# Patient Record
Sex: FEMALE | Race: WHITE | NOT HISPANIC OR LATINO | Employment: FULL TIME | URBAN - METROPOLITAN AREA
[De-identification: names, ages, dates, MRNs, and addresses within clinical notes are randomized per-mention and may not be internally consistent; named-entity substitution may affect disease eponyms.]

---

## 2023-04-10 ENCOUNTER — OFFICE VISIT (OUTPATIENT)
Dept: SLEEP MEDICINE | Facility: MEDICAL CENTER | Age: 38
End: 2023-04-10
Attending: STUDENT IN AN ORGANIZED HEALTH CARE EDUCATION/TRAINING PROGRAM
Payer: COMMERCIAL

## 2023-04-10 VITALS
SYSTOLIC BLOOD PRESSURE: 118 MMHG | DIASTOLIC BLOOD PRESSURE: 72 MMHG | RESPIRATION RATE: 16 BRPM | OXYGEN SATURATION: 98 % | BODY MASS INDEX: 44.9 KG/M2 | HEART RATE: 66 BPM | HEIGHT: 64 IN | WEIGHT: 263 LBS

## 2023-04-10 DIAGNOSIS — G47.33 OSA (OBSTRUCTIVE SLEEP APNEA): Primary | ICD-10-CM

## 2023-04-10 PROCEDURE — 99202 OFFICE O/P NEW SF 15 MIN: CPT | Performed by: STUDENT IN AN ORGANIZED HEALTH CARE EDUCATION/TRAINING PROGRAM

## 2023-04-10 PROCEDURE — 99203 OFFICE O/P NEW LOW 30 MIN: CPT | Performed by: STUDENT IN AN ORGANIZED HEALTH CARE EDUCATION/TRAINING PROGRAM

## 2023-04-10 NOTE — PROGRESS NOTES
[facility] Consult Note     Date: 4/10/2023 / Time: 8:04 AM      Thank you for requesting a sleep medicine consultation on Jackie Aguilar at the sleep center. Presents today with the chief complaints of needing new CPAP supplies. She is referred by Karla Park M.D.  [address] [place] [id number] for evaluation and treatment of obstructive sleep apnea on CPAP  HISTORY OF PRESENT ILLNESS:     Jackie Aguilar is a 38 y.o. female with obesity and obstructive sleep apnea on CPAP.  Presents to Sleep Clinic to establish care for management of obstructive sleep apnea.     States she was diagnosed approximately 5 years ago with obstructive sleep apnea via in lab sleep study.  She was told she had mild obstructive sleep apnea and she could consider starting CPAP therapy.  Then in 2020 she underwent a home sleep study which showed continued sleep apnea.  She has been told that she did snore in her sleep and did have choking and gasping episodes.  There is concern regarding this affecting her health and she elected to pursue treatment.  She has been on CPAP since 2020.  She finds that with using the CPAP machine she gets a better quality of sleep.  She also does not snore or have gasping episodes in her sleep.    She moved to [place] and with the move she changed insurances.  She was previously with [employer] who does not take her current insurance.  She is hopeful that setting up with a new DME company will allow her to get supplies and continue to use her machine.    DME provider: Roger arce   Device: Airsense 10  Mask: N30i   Aerophagia: No  Snoring: No   Dry mouth: No   Leak: No   Skin irritation: No   Chin strap: No       As per supplemental questionnaire to be scanned or imported into chart:    Barrington Sleepiness Score: 1    Sleep Schedule  Bedtime: Weekday 10 pm  Weekend 11pm  Wake time: Weekday 555am Weekend 8am  Sleep-onset latency: 30-45 min   Awakenings from sleep: 0  Difficulty falling back  "asleep: No  Bedroom partner: yes  Naps: No     DAYTIME SYMPTOMS:   Excessive daytime sleepiness: No   Daytime fatigue: No   Difficulty concentrating: No   Memory problems: No   Irritability:No   Work/school performance issues: No   Sleepiness with driving: No   Caffeine/stimulant use: Yes  Alcohol use:Yes, How Many? Ocassionally      SLEEP RELATED SYMPTOMS  Snoring: Yes, without cpap  Witnessed apnea or gasping/choking: Yes, without cpap  Dry mouth or mouth breathing: No   Sweating: No   Teeth grinding/biting: Yes  Morning headaches: No   Refreshed Upon Awakening: Yes     SLEEP RELATED BEHAVIORS:  Parasomnias (walking, talking, eating, violence): No   Leg kicking: No   Restless legs - \"urge to move\": No   Nightmares: No  Recurrent: No   Dream enactment: No      NARCOLEPSY:  Cataplexy: No   Sleep paralysis: No   Sleep attacks: No   Hypnagogic/hypnopompic hallucinations: No     MEDICAL HISTORY  Past Medical History:   Diagnosis Date    Chickenpox     Influenza     Obesity     Sleep apnea     Wears glasses         SURGICAL HISTORY  Past Surgical History:   Procedure Laterality Date    ARTHROSCOPY, KNEE          FAMILY HISTORY  Family History   Problem Relation Age of Onset    Breast Cancer Mother         currently Breast Cancer free    Sleep Apnea Mother     Alzheimer's Disease Father         FTD and Alz. diagnosis in 2017, passed away from it in 2020.    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      VITAMIN D PO Take  by mouth.      Probiotic Product (PROBIOTIC DAILY PO) Take  by mouth.       No current facility-administered medications for this visit.       REVIEW OF SYSTEMS  Constitutional: Denies fevers, Denies weight changes  Ears/Nose/Throat/Mouth: Denies nasal congestion or sore throat   Cardiovascular: Denies chest pain  Respiratory: Denies shortness of breath, Denies cough  Gastrointestinal/Hepatic: Denies nausea, vomiting  Sleep: see HPI    Physical Examination:  Vitals/ General Appearance:   Weight/BMI: Body mass index is 45.14 kg/m².  /72 (BP Location: Left arm, Patient Position: Sitting, BP Cuff Size: Large adult)   Pulse 66   Resp 16   Ht 1.626 m (5' 4\")   Wt 119 kg (263 lb)   SpO2 98%   Vitals:    04/10/23 0801   BP: 118/72   BP Location: Left arm   Patient Position: Sitting   BP Cuff Size: Large adult   Pulse: 66   Resp: 16   SpO2: 98%   Weight: 119 kg (263 lb)   Height: 1.626 m (5' 4\")       Pt. is alert and oriented to time, place and person. Cooperative and in no apparent distress.     Constitutional: Alert, no distress, well-groomed.  Skin: No rashes in visible areas.  Eye: Round. Conjunctiva clear, lids normal. No icterus.   ENT EXAM  Nasal alae/valves collapsible: No   Nasal septum deviation: No   Nasal turbinate hypertrophy: Left: Grade 1   Right: Grade 1  Hard palate narrow: No   Hard palate high: No   Soft palate/uvula (Mallampati score): 3  Tongue Scalloping: Yes  Retrognathia: No   Micrognathia: No   Cardiovascular:no murmus/gallops/rubs, normal S1 and S2 heart sounds, regular rate and rhythm  Pulmonary:Clear to auscultation, No wheezes, No crackles.  Neurologic:Awake, alert and oriented x 3, Normal age appropriate gait, No involuntary motions.  Extremities: No clubbing, cyanosis, or edema       ASSESSMENT AND PLAN   Jackie Aguilar is a 38 y.o. female with obesity and obstructive sleep apnea on CPAP.  Presents to Sleep Clinic to " establish care for management of obstructive sleep apnea.     1.  Obstructive sleep apnea  The medical record was reviewed.    Compliance data reviewed showing 100% usage > 4hours in last 30  days. Average AHI 0.5 events/hour. 90-95% pressure 10.2 CWP. Pt continues to use and benefit from machine.        PLAN:   -Order placed for mask and supplies   -Advised to reach out via MyChart with questions     Has been advised to continue the current CPAP, clean equipment frequently, and get new mask and supplies as allowed by insurance and DME. Recommend an earlier appointment, if significant treatment barriers develop.    Patients with NATE are at increased risk of cardiovascular disease including coronary artery disease, systemic arterial hypertension, pulmonary arterial hypertension, cardiac arrythmias, and stroke. The patient was advised to avoid driving a motor vehicle when drowsy.    Positive airway pressure will favorably impact many of the adverse conditions and effects provoked by NATE.    Have advised the patient to follow up with the appropriate healthcare practitioners for all other medical problems and issues.    Return in about 1 year (around 4/10/2024).       2.  Regarding treatment of other past medical problems and general health maintenance,  Pt is urged to follow up with PCP.      Please note portions of this record was created using voice recognition software. I have made every reasonable attempt to correct obvious errors, but I expect that there are errors of grammar and possibly content I did not discover before finalizing the note.

## 2023-05-04 ENCOUNTER — HOSPITAL ENCOUNTER (OUTPATIENT)
Dept: LAB | Facility: MEDICAL CENTER | Age: 38
End: 2023-05-04
Attending: FAMILY MEDICINE
Payer: COMMERCIAL

## 2023-05-04 LAB
BASOPHILS # BLD AUTO: 0.9 % (ref 0–1.8)
BASOPHILS # BLD: 0.08 K/UL (ref 0–0.12)
EOSINOPHIL # BLD AUTO: 0.27 K/UL (ref 0–0.51)
EOSINOPHIL NFR BLD: 2.9 % (ref 0–6.9)
ERYTHROCYTE [DISTWIDTH] IN BLOOD BY AUTOMATED COUNT: 50.4 FL (ref 35.9–50)
HCT VFR BLD AUTO: 42.8 % (ref 37–47)
HGB BLD-MCNC: 13.1 G/DL (ref 12–16)
IMM GRANULOCYTES # BLD AUTO: 0.04 K/UL (ref 0–0.11)
IMM GRANULOCYTES NFR BLD AUTO: 0.4 % (ref 0–0.9)
LYMPHOCYTES # BLD AUTO: 2.76 K/UL (ref 1–4.8)
LYMPHOCYTES NFR BLD: 30.1 % (ref 22–41)
MCH RBC QN AUTO: 27.3 PG (ref 27–33)
MCHC RBC AUTO-ENTMCNC: 30.6 G/DL (ref 33.6–35)
MCV RBC AUTO: 89.4 FL (ref 81.4–97.8)
MONOCYTES # BLD AUTO: 0.39 K/UL (ref 0–0.85)
MONOCYTES NFR BLD AUTO: 4.3 % (ref 0–13.4)
NEUTROPHILS # BLD AUTO: 5.63 K/UL (ref 2–7.15)
NEUTROPHILS NFR BLD: 61.4 % (ref 44–72)
NRBC # BLD AUTO: 0 K/UL
NRBC BLD-RTO: 0 /100 WBC
PLATELET # BLD AUTO: 301 K/UL (ref 164–446)
PMV BLD AUTO: 13 FL (ref 9–12.9)
RBC # BLD AUTO: 4.79 M/UL (ref 4.2–5.4)
WBC # BLD AUTO: 9.2 K/UL (ref 4.8–10.8)

## 2023-05-04 PROCEDURE — 84480 ASSAY TRIIODOTHYRONINE (T3): CPT

## 2023-05-04 PROCEDURE — 36415 COLL VENOUS BLD VENIPUNCTURE: CPT

## 2023-05-04 PROCEDURE — 80061 LIPID PANEL: CPT

## 2023-05-04 PROCEDURE — 84443 ASSAY THYROID STIM HORMONE: CPT

## 2023-05-04 PROCEDURE — 82533 TOTAL CORTISOL: CPT

## 2023-05-04 PROCEDURE — 83001 ASSAY OF GONADOTROPIN (FSH): CPT

## 2023-05-04 PROCEDURE — 83002 ASSAY OF GONADOTROPIN (LH): CPT

## 2023-05-04 PROCEDURE — 85025 COMPLETE CBC W/AUTO DIFF WBC: CPT

## 2023-05-04 PROCEDURE — 80053 COMPREHEN METABOLIC PANEL: CPT

## 2023-05-04 PROCEDURE — 82670 ASSAY OF TOTAL ESTRADIOL: CPT

## 2023-05-04 PROCEDURE — 83525 ASSAY OF INSULIN: CPT

## 2023-05-04 PROCEDURE — 84439 ASSAY OF FREE THYROXINE: CPT

## 2023-05-05 LAB
ALBUMIN SERPL BCP-MCNC: 4.3 G/DL (ref 3.2–4.9)
ALBUMIN/GLOB SERPL: 1.4 G/DL
ALP SERPL-CCNC: 72 U/L (ref 30–99)
ALT SERPL-CCNC: 31 U/L (ref 2–50)
ANION GAP SERPL CALC-SCNC: 13 MMOL/L (ref 7–16)
AST SERPL-CCNC: 30 U/L (ref 12–45)
BILIRUB SERPL-MCNC: 0.3 MG/DL (ref 0.1–1.5)
BUN SERPL-MCNC: 13 MG/DL (ref 8–22)
CALCIUM ALBUM COR SERPL-MCNC: 8.9 MG/DL (ref 8.5–10.5)
CALCIUM SERPL-MCNC: 9.1 MG/DL (ref 8.5–10.5)
CHLORIDE SERPL-SCNC: 104 MMOL/L (ref 96–112)
CHOLEST SERPL-MCNC: 200 MG/DL (ref 100–199)
CO2 SERPL-SCNC: 23 MMOL/L (ref 20–33)
CORTIS SERPL-MCNC: 7 UG/DL (ref 0–23)
CREAT SERPL-MCNC: 0.47 MG/DL (ref 0.5–1.4)
ESTRADIOL SERPL-MCNC: 80 PG/ML
FASTING STATUS PATIENT QL REPORTED: NORMAL
FSH SERPL-ACNC: 8.7 MIU/ML
GFR SERPLBLD CREATININE-BSD FMLA CKD-EPI: 126 ML/MIN/1.73 M 2
GLOBULIN SER CALC-MCNC: 3.1 G/DL (ref 1.9–3.5)
GLUCOSE SERPL-MCNC: 92 MG/DL (ref 65–99)
HDLC SERPL-MCNC: 47 MG/DL
LDLC SERPL CALC-MCNC: 130 MG/DL
LH SERPL-ACNC: 36.9 IU/L
POTASSIUM SERPL-SCNC: 4.4 MMOL/L (ref 3.6–5.5)
PROT SERPL-MCNC: 7.4 G/DL (ref 6–8.2)
SODIUM SERPL-SCNC: 140 MMOL/L (ref 135–145)
T3 SERPL-MCNC: 142 NG/DL (ref 60–181)
T4 FREE SERPL-MCNC: 1.07 NG/DL (ref 0.93–1.7)
TRIGL SERPL-MCNC: 115 MG/DL (ref 0–149)
TSH SERPL DL<=0.005 MIU/L-ACNC: 1.18 UIU/ML (ref 0.38–5.33)

## 2023-05-06 LAB — INSULIN P FAST SERPL-ACNC: 19 UIU/ML (ref 3–25)

## 2023-10-09 ENCOUNTER — TELEPHONE (OUTPATIENT)
Dept: SLEEP MEDICINE | Facility: MEDICAL CENTER | Age: 38
End: 2023-10-09
Payer: COMMERCIAL

## 2023-10-12 ENCOUNTER — TELEMEDICINE (OUTPATIENT)
Dept: SLEEP MEDICINE | Facility: MEDICAL CENTER | Age: 38
End: 2023-10-12
Attending: NURSE PRACTITIONER
Payer: COMMERCIAL

## 2023-10-12 VITALS — HEIGHT: 64 IN | BODY MASS INDEX: 39.78 KG/M2 | WEIGHT: 233 LBS

## 2023-10-12 DIAGNOSIS — G47.33 OSA (OBSTRUCTIVE SLEEP APNEA): ICD-10-CM

## 2023-10-12 PROCEDURE — 99213 OFFICE O/P EST LOW 20 MIN: CPT | Mod: 95 | Performed by: NURSE PRACTITIONER

## 2023-10-12 RX ORDER — SEMAGLUTIDE 0.5 MG/.5ML
INJECTION, SOLUTION SUBCUTANEOUS
COMMUNITY
Start: 2023-09-29

## 2023-10-12 ASSESSMENT — FIBROSIS 4 INDEX: FIB4 SCORE: 0.64

## 2023-10-12 ASSESSMENT — PATIENT HEALTH QUESTIONNAIRE - PHQ9: CLINICAL INTERPRETATION OF PHQ2 SCORE: 0

## 2023-10-12 NOTE — PATIENT INSTRUCTIONS
Order for mask and supplies will be sent to the following DME company:  [employer]  Located in: [facility]  Address: [address] [place] [id number], [place] [address]  Phone: [phone]    Contact in 1 week for order status.  Also mask sample left at  for Tonya nasal mask fit pack.

## 2023-10-12 NOTE — PROGRESS NOTES
Virtual Visit: Established Patient   This visit was conducted via Zoom using secure and encrypted videoconferencing technology.   The patient was in their home in the state of [place].    The patient's identity was confirmed and verbal consent was obtained for this virtual visit.     Subjective:   CC:   Chief Complaint   Patient presents with    Follow-Up     NATE  LAST SEEN ON 4/10/2023 - DR. UMBERTO MEJIADANIELA ANDREWS is a 38 y.o. female presenting for evaluation and management of:    NATE.  Last seen 4/10/2023 by Dr. Camargo.  Since last visit patient switched insurance companies and needs new office visit for mask and supplies. States she was diagnosed approximately 5 years ago with obstructive sleep apnea via in lab sleep study.  She was told she had mild obstructive sleep apnea and she could consider starting CPAP therapy.  Then in 2020 she underwent a home sleep study which showed continued sleep apnea.  She has been told that she did snore in her sleep and did have choking and gasping episodes.  There is concern regarding this affecting her health and she elected to pursue treatment.  She has been on CPAP since 2020.  She finds that with using the CPAP machine she gets a better quality of sleep.  She also does not snore or have gasping episodes in her sleep.     She moved to [place] and with the move she changed insurances.  She was previously with [employer] who does not take her current insurance.  She is hopeful that setting up with a new [employer] company will allow her to get supplies and continue to use her machine.    30-day compliance reviewed with patient shows 100% use with an average time of 6 hours and 45 minutes and a resultant AHI of 0.5 with no evidence of persistent mask leak.  She is currently using a [employer]Med AirFit N30 I does not like the mask due to the noisy foam piece inserted into the middle of the nose piece.  She averages 7 hours of sleep and denies any difficulty falling or staying asleep.  She notes  "overall improvement in sleep quality and denies any excessive daytime sleepiness, morning headaches, palpitations, concentration or memory problems.    ROS   Per HPI    Current medicines (including changes today)  Current Outpatient Medications   Medication Sig Dispense Refill    WEGOVY 0.5 MG/0.5ML Solution Auto-injector Pen-injector inject 0.5 mL subcutaneous once a week      VITAMIN D PO Take  by mouth.      Probiotic Product (PROBIOTIC DAILY PO) Take  by mouth.      Cetirizine HCl (ZYRTEC PO) Take  by mouth.       No current facility-administered medications for this visit.       There are no problems to display for this patient.       Objective:   Ht 1.626 m (5' 4\")   Wt 106 kg (233 lb)   BMI 39.99 kg/m²     Physical Exam:  Constitutional: Alert, no distress, well-groomed.  Skin: No rashes in visible areas.  Eye: Round. Conjunctiva clear, lids normal. No icterus.   ENMT: Lips pink without lesions, good dentition, moist mucous membranes. Phonation normal.  Neck: No masses, no thyromegaly. Moves freely without pain.  Respiratory: Unlabored respiratory effort, no cough or audible wheeze  Psych: Alert and oriented x3, normal affect and mood.     Assessment and Plan:   The following treatment plan was discussed:     1. NATE (obstructive sleep apnea)  - DME Mask and Supplies    Other orders  - WEGOVY 0.5 MG/0.5ML Solution Auto-injector Pen-injector; inject 0.5 mL subcutaneous once a week    Patient is using and benefiting from CPAP therapy.  Compliance reviewed with patient shows adequate use and control of NATE.  Order placed for mask and supplies which will be good for 1 year through [employer].  Please follow-up annually, but can be seen sooner if needed.    Follow-up: Return in about 1 year (around 10/12/2024).           "

## 2024-01-08 ENCOUNTER — HOSPITAL ENCOUNTER (OUTPATIENT)
Dept: CARDIOLOGY | Facility: MEDICAL CENTER | Age: 39
End: 2024-01-08
Attending: FAMILY MEDICINE
Payer: COMMERCIAL

## 2024-01-08 DIAGNOSIS — R06.02 SHORTNESS OF BREATH: ICD-10-CM

## 2024-01-08 DIAGNOSIS — R07.89 OTHER CHEST PAIN: ICD-10-CM

## 2024-01-08 LAB — LV EJECT FRACT  99904: 65

## 2024-01-08 PROCEDURE — 93306 TTE W/DOPPLER COMPLETE: CPT | Mod: 26 | Performed by: INTERNAL MEDICINE

## 2024-01-08 PROCEDURE — 93306 TTE W/DOPPLER COMPLETE: CPT

## 2024-02-09 ENCOUNTER — OFFICE VISIT (OUTPATIENT)
Dept: URGENT CARE | Facility: PHYSICIAN GROUP | Age: 39
End: 2024-02-09
Payer: COMMERCIAL

## 2024-02-09 ENCOUNTER — HOSPITAL ENCOUNTER (OUTPATIENT)
Facility: MEDICAL CENTER | Age: 39
End: 2024-02-09
Attending: PHYSICIAN ASSISTANT
Payer: COMMERCIAL

## 2024-02-09 VITALS
TEMPERATURE: 98.4 F | DIASTOLIC BLOOD PRESSURE: 80 MMHG | RESPIRATION RATE: 16 BRPM | HEART RATE: 111 BPM | HEIGHT: 64 IN | WEIGHT: 213.6 LBS | SYSTOLIC BLOOD PRESSURE: 134 MMHG | OXYGEN SATURATION: 98 % | BODY MASS INDEX: 36.47 KG/M2

## 2024-02-09 DIAGNOSIS — R30.0 DYSURIA: ICD-10-CM

## 2024-02-09 DIAGNOSIS — N30.01 ACUTE CYSTITIS WITH HEMATURIA: ICD-10-CM

## 2024-02-09 LAB
APPEARANCE UR: NORMAL
BILIRUB UR STRIP-MCNC: NEGATIVE MG/DL
COLOR UR AUTO: NORMAL
GLUCOSE UR STRIP.AUTO-MCNC: NEGATIVE MG/DL
KETONES UR STRIP.AUTO-MCNC: NEGATIVE MG/DL
LEUKOCYTE ESTERASE UR QL STRIP.AUTO: NORMAL
NITRITE UR QL STRIP.AUTO: NEGATIVE
PH UR STRIP.AUTO: 6 [PH] (ref 5–8)
POCT INT CON NEG: NEGATIVE
POCT INT CON POS: POSITIVE
POCT URINE PREGNANCY TEST: NEGATIVE
PROT UR QL STRIP: NORMAL MG/DL
RBC UR QL AUTO: NORMAL
SP GR UR STRIP.AUTO: 1.01
UROBILINOGEN UR STRIP-MCNC: NORMAL MG/DL

## 2024-02-09 PROCEDURE — 87186 SC STD MICRODIL/AGAR DIL: CPT

## 2024-02-09 PROCEDURE — 99204 OFFICE O/P NEW MOD 45 MIN: CPT | Performed by: PHYSICIAN ASSISTANT

## 2024-02-09 PROCEDURE — 87510 GARDNER VAG DNA DIR PROBE: CPT

## 2024-02-09 PROCEDURE — 3075F SYST BP GE 130 - 139MM HG: CPT | Performed by: PHYSICIAN ASSISTANT

## 2024-02-09 PROCEDURE — 81002 URINALYSIS NONAUTO W/O SCOPE: CPT | Performed by: PHYSICIAN ASSISTANT

## 2024-02-09 PROCEDURE — 3079F DIAST BP 80-89 MM HG: CPT | Performed by: PHYSICIAN ASSISTANT

## 2024-02-09 PROCEDURE — 87086 URINE CULTURE/COLONY COUNT: CPT

## 2024-02-09 PROCEDURE — 81025 URINE PREGNANCY TEST: CPT | Performed by: PHYSICIAN ASSISTANT

## 2024-02-09 PROCEDURE — 87480 CANDIDA DNA DIR PROBE: CPT

## 2024-02-09 PROCEDURE — 87077 CULTURE AEROBIC IDENTIFY: CPT

## 2024-02-09 PROCEDURE — 87660 TRICHOMONAS VAGIN DIR PROBE: CPT

## 2024-02-09 RX ORDER — NITROFURANTOIN 25; 75 MG/1; MG/1
100 CAPSULE ORAL 2 TIMES DAILY
Qty: 10 CAPSULE | Refills: 0 | Status: SHIPPED | OUTPATIENT
Start: 2024-02-09 | End: 2024-02-14

## 2024-02-09 ASSESSMENT — ENCOUNTER SYMPTOMS
CHILLS: 0
FLANK PAIN: 0
FEVER: 0

## 2024-02-09 ASSESSMENT — FIBROSIS 4 INDEX: FIB4 SCORE: 0.66

## 2024-02-10 DIAGNOSIS — N30.01 ACUTE CYSTITIS WITH HEMATURIA: ICD-10-CM

## 2024-02-10 NOTE — PROGRESS NOTES
Subjective:   Jackie Aguilar is a 39 y.o. female who presents today with   Chief Complaint   Patient presents with    Dysuria     Starting this morning, felt at first like a yeast infection but as day progressed it started feeling worse. Saw some blood when urinating. Painful to urinate.      Dysuria   This is a new problem. The current episode started today. The problem has been unchanged. The quality of the pain is described as burning. The pain is mild. She is Sexually active. There is No history of pyelonephritis. Associated symptoms include hematuria. Pertinent negatives include no chills, flank pain, frequency or urgency.     Patient states no concerns for STDs at this time.  No new sexual partners.      PMH:  has a past medical history of Chickenpox, Influenza, Obesity, Sleep apnea, and Wears glasses.  MEDS:   Current Outpatient Medications:     Loratadine (CLARITIN PO), Take 1 Dose by mouth every day., Disp: , Rfl:     Semaglutide, 1 MG/DOSE, 2 MG/1.5ML Solution Pen-injector, Inject 1 mg under the skin every 7 days., Disp: , Rfl:     nitrofurantoin (MACROBID) 100 MG Cap, Take 1 Capsule by mouth 2 times a day for 5 days., Disp: 10 Capsule, Rfl: 0    VITAMIN D PO, Take  by mouth., Disp: , Rfl:     Probiotic Product (PROBIOTIC DAILY PO), Take  by mouth., Disp: , Rfl:     WEGOVY 0.5 MG/0.5ML Solution Auto-injector Pen-injector, inject 0.5 mL subcutaneous once a week, Disp: , Rfl:     Cetirizine HCl (ZYRTEC PO), Take  by mouth., Disp: , Rfl:   ALLERGIES:   Allergies   Allergen Reactions    Anesthetics, Amide [Amides]      SURGHX:   Past Surgical History:   Procedure Laterality Date    ARTHROSCOPY, KNEE       SOCHX:  reports that she has never smoked. She has never used smokeless tobacco. She reports current alcohol use of about 1.8 oz of alcohol per week. She reports that she does not currently use drugs after having used the following drugs: Marijuana.  FH: Reviewed with patient, not pertinent to this  "visit.     Review of Systems   Constitutional:  Negative for chills and fever.   Genitourinary:  Positive for dysuria and hematuria. Negative for flank pain, frequency and urgency.      Objective:   /80   Pulse (!) 111   Temp 36.9 °C (98.4 °F)   Resp 16   Ht 1.626 m (5' 4\")   Wt 96.9 kg (213 lb 9.6 oz)   LMP 01/31/2024 (Exact Date)   SpO2 98%   BMI 36.66 kg/m²   Physical Exam  Vitals and nursing note reviewed.   Constitutional:       General: She is not in acute distress.     Appearance: Normal appearance. She is well-developed. She is not ill-appearing or toxic-appearing.   HENT:      Head: Normocephalic and atraumatic.      Right Ear: Hearing normal.      Left Ear: Hearing normal.   Cardiovascular:      Rate and Rhythm: Normal rate.   Pulmonary:      Effort: Pulmonary effort is normal.   Abdominal:      Tenderness: There is abdominal tenderness in the suprapubic area. There is no right CVA tenderness or left CVA tenderness.   Genitourinary:     Comments: Patient deferred  exam  Musculoskeletal:      Comments: Normal movement in all 4 extremities   Skin:     General: Skin is warm and dry.   Neurological:      Mental Status: She is alert.      Coordination: Coordination normal.   Psychiatric:         Mood and Affect: Mood normal.       UA consistent with infection  HCG negative    Assessment/Plan:   Assessment    1. Acute cystitis with hematuria  - nitrofurantoin (MACROBID) 100 MG Cap; Take 1 Capsule by mouth 2 times a day for 5 days.  Dispense: 10 Capsule; Refill: 0  - URINE CULTURE(NEW); Future  - VAGINAL PATHOGENS DNA PANEL; Future    2. Dysuria  - POCT Urinalysis  - POCT Pregnancy    Other orders  - Loratadine (CLARITIN PO); Take 1 Dose by mouth every day.  - Semaglutide, 1 MG/DOSE, 2 MG/1.5ML Solution Pen-injector; Inject 1 mg under the skin every 7 days.    Symptoms and presentation appear consistent with urinary tract infection and we will treat accordingly with antibiotics.  Recommend ruling " out BV and yeast infection with self swab and she agrees.    Differential diagnosis, natural history, supportive care, and indications for immediate follow-up discussed.   Patient given instructions and understanding of medications and treatment.    If not improving in 3-5 days, F/U with PCP or return to UC if symptoms worsen.    Patient agreeable to plan.    Please note that this dictation was created using voice recognition software. I have made every reasonable attempt to correct obvious errors, but I expect that there are errors of grammar and possibly content that I did not discover before finalizing the note.    Rui Nguyễn PA-C

## 2024-02-11 LAB
CANDIDA DNA VAG QL PROBE+SIG AMP: NEGATIVE
G VAGINALIS DNA VAG QL PROBE+SIG AMP: NEGATIVE
T VAGINALIS DNA VAG QL PROBE+SIG AMP: NEGATIVE

## 2024-02-12 LAB
BACTERIA UR CULT: ABNORMAL
BACTERIA UR CULT: ABNORMAL
SIGNIFICANT IND 70042: ABNORMAL
SITE SITE: ABNORMAL
SOURCE SOURCE: ABNORMAL

## 2024-02-28 ENCOUNTER — OFFICE VISIT (OUTPATIENT)
Dept: URGENT CARE | Facility: CLINIC | Age: 39
End: 2024-02-28
Payer: COMMERCIAL

## 2024-02-28 VITALS
DIASTOLIC BLOOD PRESSURE: 62 MMHG | TEMPERATURE: 97.4 F | BODY MASS INDEX: 35 KG/M2 | SYSTOLIC BLOOD PRESSURE: 108 MMHG | RESPIRATION RATE: 16 BRPM | HEART RATE: 62 BPM | OXYGEN SATURATION: 96 % | HEIGHT: 64 IN | WEIGHT: 205 LBS

## 2024-02-28 DIAGNOSIS — B96.89 ACUTE BACTERIAL SINUSITIS: ICD-10-CM

## 2024-02-28 DIAGNOSIS — J01.90 ACUTE BACTERIAL SINUSITIS: ICD-10-CM

## 2024-02-28 PROCEDURE — 99213 OFFICE O/P EST LOW 20 MIN: CPT | Performed by: PHYSICIAN ASSISTANT

## 2024-02-28 PROCEDURE — 3074F SYST BP LT 130 MM HG: CPT | Performed by: PHYSICIAN ASSISTANT

## 2024-02-28 PROCEDURE — 3078F DIAST BP <80 MM HG: CPT | Performed by: PHYSICIAN ASSISTANT

## 2024-02-28 RX ORDER — AMOXICILLIN AND CLAVULANATE POTASSIUM 875; 125 MG/1; MG/1
1 TABLET, FILM COATED ORAL 2 TIMES DAILY
Qty: 14 TABLET | Refills: 0 | Status: SHIPPED | OUTPATIENT
Start: 2024-02-28 | End: 2024-03-06

## 2024-02-28 RX ORDER — SEMAGLUTIDE 0.25 MG/.5ML
INJECTION, SOLUTION SUBCUTANEOUS
COMMUNITY

## 2024-02-28 RX ORDER — PREDNISONE 10 MG/1
40 TABLET ORAL DAILY
Qty: 20 TABLET | Refills: 0 | Status: SHIPPED | OUTPATIENT
Start: 2024-02-28 | End: 2024-03-04

## 2024-02-28 ASSESSMENT — ENCOUNTER SYMPTOMS
HEADACHES: 1
SORE THROAT: 1
CHILLS: 0
COUGH: 1
CONSTIPATION: 0
SHORTNESS OF BREATH: 0
VOMITING: 0
EYE PAIN: 0
FEVER: 0
NAUSEA: 0
ABDOMINAL PAIN: 0
DIARRHEA: 0
MYALGIAS: 0

## 2024-02-28 ASSESSMENT — FIBROSIS 4 INDEX: FIB4 SCORE: 0.66

## 2024-02-29 NOTE — PROGRESS NOTES
"Subjective:   Jackie Aguilar is a 39 y.o. female who presents for Fever (2/14 started with  Fever, congestion, cough, and sore throat, feeling better cough still persistent, ear pressure, )      39-year-old female began feeling sick on 214 with typical cold-like symptoms, in general she seem like she was improving but her last few days has started to notice more malaise and fatigue, more ear pain and pressure, more productive sputum.  She has not had more sinus pain or pressure.  She is not using an antihistamine, nasal steroid, Lacey pot and Mucinex nightly.  Her cough is been shallow and mild and intermittent.  She also notes some mild sore throat.    Review of Systems   Constitutional:  Positive for malaise/fatigue. Negative for chills and fever.   HENT:  Positive for congestion, ear pain, hearing loss and sore throat.    Eyes:  Negative for pain.   Respiratory:  Positive for cough. Negative for shortness of breath.    Cardiovascular:  Negative for chest pain.   Gastrointestinal:  Negative for abdominal pain, constipation, diarrhea, nausea and vomiting.   Genitourinary:  Negative for dysuria.   Musculoskeletal:  Negative for myalgias.   Skin:  Negative for rash.   Neurological:  Positive for headaches.       Medications, Allergies, and current problem list reviewed today in Epic.     Objective:     /62   Pulse 62   Temp 36.3 °C (97.4 °F) (Temporal)   Resp 16   Ht 1.626 m (5' 4\")   Wt 93 kg (205 lb)   SpO2 96%     Physical Exam  Vitals reviewed.   Constitutional:       Appearance: Normal appearance.   HENT:      Head: Normocephalic and atraumatic.      Right Ear: External ear normal.      Left Ear: External ear normal.      Ears:      Comments: No erythema TMs however they are bulging with suppurative fluid behind     Nose: Congestion and rhinorrhea present.      Mouth/Throat:      Mouth: Mucous membranes are moist.      Pharynx: No oropharyngeal exudate or posterior oropharyngeal erythema.      " Comments: POST NASAL DRIP  Eyes:      Conjunctiva/sclera: Conjunctivae normal.   Cardiovascular:      Rate and Rhythm: Normal rate and regular rhythm.   Pulmonary:      Effort: Pulmonary effort is normal.      Breath sounds: Normal breath sounds.   Musculoskeletal:      Cervical back: Normal range of motion.   Skin:     General: Skin is warm and dry.      Capillary Refill: Capillary refill takes less than 2 seconds.   Neurological:      Mental Status: She is alert and oriented to person, place, and time.         Assessment/Plan:     Diagnosis and associated orders:     1. Acute bacterial sinusitis  predniSONE (DELTASONE) 10 MG Tab    amoxicillin-clavulanate (AUGMENTIN) 875-125 MG Tab         Comments/MDM:     Recommend patient trial a 5-day course of prednisone.  If failing to improve after the prednisone therapy or worsening at any point I would recommend antibiotics.  She does not have the clear hallmarks of a sinus infection currently and on exam does not appear to have otitis media but I feel like both of these could imminently take place in the near future.  Likewise the fact that she is worsening and it has been over 2 weeks since onset is indicative for a secondary bacterial infection         Differential diagnosis, natural history, supportive care, and indications for immediate follow-up discussed.    Advised the patient to follow-up with the primary care physician for recheck, reevaluation, and consideration of further management.    Please note that this dictation was created using voice recognition software. I have made a reasonable attempt to correct obvious errors, but I expect that there are errors of grammar and possibly content that I did not discover before finalizing the note.    This note was electronically signed by Chapo Vigil PA-C

## 2024-09-25 ENCOUNTER — OFFICE VISIT (OUTPATIENT)
Dept: SLEEP MEDICINE | Facility: MEDICAL CENTER | Age: 39
End: 2024-09-25
Attending: NURSE PRACTITIONER
Payer: COMMERCIAL

## 2024-09-25 VITALS
BODY MASS INDEX: 31.76 KG/M2 | WEIGHT: 186 LBS | SYSTOLIC BLOOD PRESSURE: 112 MMHG | DIASTOLIC BLOOD PRESSURE: 64 MMHG | HEART RATE: 86 BPM | RESPIRATION RATE: 16 BRPM | OXYGEN SATURATION: 95 % | HEIGHT: 64 IN

## 2024-09-25 DIAGNOSIS — Z78.9 NONSMOKER: ICD-10-CM

## 2024-09-25 DIAGNOSIS — G47.33 OBSTRUCTIVE SLEEP APNEA: ICD-10-CM

## 2024-09-25 PROCEDURE — 3078F DIAST BP <80 MM HG: CPT | Performed by: NURSE PRACTITIONER

## 2024-09-25 PROCEDURE — 3074F SYST BP LT 130 MM HG: CPT | Performed by: NURSE PRACTITIONER

## 2024-09-25 PROCEDURE — 99213 OFFICE O/P EST LOW 20 MIN: CPT | Performed by: NURSE PRACTITIONER

## 2024-09-25 ASSESSMENT — PATIENT HEALTH QUESTIONNAIRE - PHQ9: CLINICAL INTERPRETATION OF PHQ2 SCORE: 0

## 2024-09-25 ASSESSMENT — FIBROSIS 4 INDEX: FIB4 SCORE: 0.66

## 2024-09-25 NOTE — PROGRESS NOTES
Chief Complaint   Patient presents with    Apnea     Last Office Visit 10/12/2023 with MARIA GUADALUPE Jensen.       HPI:  Jackie Aguilar is a 39 y.o. year old female here today for follow-up on NATE.  Last OV 10/12/23 via telemedicine with Rufus JIMENEZ     Only living in [facility].    Currently using APAP @ 8-12cm H20 nightly; RESMED; device obtained 6/24/21.  Compliance report 8/27/2024 through 9/25/2024 indicates 97% compliance, average nightly is 5 hours 7 minutes, mean pressure 8.7 cm, no significant mask leak with reduced AHI of 0.5/h.  Reviewed with patient.    Interval events  9/25/2024: Patient notes losing 80 pounds in the last year after starting Wegovy through primary care.  She has not started maintenance therapy on this but would like to continue with further weight loss.  She continues to tolerate CPAP but notes in the last few weeks waking up at 2/3 AM in the morning removing the mask and sometimes being unaware of it and continue to sleep at night.  She notes initial start of pressure to be comfortable.  Her mask is comfortable.  She goes to bed at 9 PM usually asleep by 11 PM.  She admits to playing on her phone.  She generally sleeps through the night till the morning.    Sleep hx:  Diagnosed in 2018 with sleep apnea in sleep lab.  2020 underwent home sleep study showing continued sleep apnea.  Started CPAP in 2020.  Moved to [place] in 2023 with change in insurance and needing new supplies.          ROS: As per HPI and otherwise negative if not stated.    Past Medical History:   Diagnosis Date    Chickenpox     Influenza     Obesity     Sleep apnea     Wears glasses        Past Surgical History:   Procedure Laterality Date    ARTHROSCOPY, KNEE         Family History   Problem Relation Age of Onset    Breast Cancer Mother         currently Breast Cancer free    Sleep Apnea Mother     Alzheimer's Disease Father         FTD and Alz. diagnosis in 2017, passed away from it in 2020.     "Prostate cancer Maternal Aunt        Social History     Socioeconomic History    Marital status:      Spouse name: Not on file    Number of children: Not on file    Years of education: Not on file    Highest education level: Not on file   Occupational History    Not on file   Tobacco Use    Smoking status: Never    Smokeless tobacco: Never   Vaping Use    Vaping status: Never Used   Substance and Sexual Activity    Alcohol use: Yes     Alcohol/week: 1.8 oz     Types: 3 Glasses of wine per week     Comment: occ    Drug use: Not Currently     Types: Marijuana     Comment: gummies occasionally    Sexual activity: Yes     Partners: Male   Other Topics Concern    Not on file   Social History Narrative    Not on file     Social Determinants of Health     Financial Resource Strain: Not on file   Food Insecurity: Not on file   Transportation Needs: Not on file   Physical Activity: Not on file   Stress: Not on file   Social Connections: Not on file   Intimate Partner Violence: Not on file   Housing Stability: Not on file       Allergies as of 09/25/2024 - Reviewed 09/25/2024   Allergen Reaction Noted    Anesthetics, amide [amides]  06/18/2022        Vitals:  /64 (BP Location: Left arm, Patient Position: Sitting, BP Cuff Size: Adult)   Pulse 86   Resp 16   Ht 1.626 m (5' 4\")   Wt 84.4 kg (186 lb)   SpO2 95%     Current medications as of today   Current Outpatient Medications   Medication Sig Dispense Refill    Loratadine (CLARITIN PO) Take 1 Dose by mouth every day.      VITAMIN D PO Take  by mouth.      Probiotic Product (PROBIOTIC DAILY PO) Take  by mouth.       No current facility-administered medications for this visit.         Physical Exam:   Gen:           Alert and oriented, No apparent distress. Mood and affect appropriate, normal interaction with examiner.  Eyes:          PERRL, EOM intact, sclere white, conjunctive moist.  Ears:          Not examined.   Hearing:     Grossly intact.  Nose:        "   Normal, no lesions or deformities.  Dentition:    Not examined.   Oropharynx:   Not examined.   Mallampati Classification: Not examined.   Neck:        Supple, trachea midline, no masses.  Respiratory Effort: No intercostal retractions or use of accessory muscles.   Lung Auscultation:      Clear to auscultation bilaterally; no rales, rhonchi or wheezing.  CV:            Regular rate and rhythm. No murmurs, rubs or gallops.  Abd:           Not examined.   Lymphadenopathy: Not examined.  Gait and Station: Normal.  Digits and Nails: No clubbing, cyanosis, petechiae, or nodes.   Cranial Nerves: II-XII grossly intact.  Skin:        No rashes, lesions or ulcers noted.               Ext:           No cyanosis or edema.      Assessment:  1. Obstructive sleep apnea        2. BMI 31.0-31.9,adult  HEIGHT AND WEIGHT      3. Nonsmoker            Immunizations:    Flu:recommend fall 2024, patient declines today  Pneumovax 23:not due  Prevnar 13:not due  PCV 20: not due  COVID-19: recommend    Plan:  NATE is improved on therapy.  She will continue to benefit from auto CPAP.  Recommend [place] pressure adjustment to auto CPAP 4 to 10 cm performed in office.  Will continue to monitor weight loss.  When patient is off weight loss medication and maintaining we will consider updating sleep study.   DME mask/supplies   DME other - pressure adjustment and SIM card for CPAP  Encouraged weight loss through healthy diet and activity and management of medications through primary care.  Follow-up in 6 months to review therapy, sooner if needed.    Please note that this dictation was created using voice recognition software. I have made every reasonable attempt to correct obvious errors, but it is possible there are errors of grammar and possibly content that I did not discover before finalizing the note.